# Patient Record
Sex: MALE | Race: WHITE | NOT HISPANIC OR LATINO | Employment: FULL TIME | URBAN - METROPOLITAN AREA
[De-identification: names, ages, dates, MRNs, and addresses within clinical notes are randomized per-mention and may not be internally consistent; named-entity substitution may affect disease eponyms.]

---

## 2017-08-29 ENCOUNTER — LAB REQUISITION (OUTPATIENT)
Dept: LAB | Facility: HOSPITAL | Age: 52
End: 2017-08-29

## 2017-08-29 DIAGNOSIS — D12.6 BENIGN NEOPLASM OF COLON: ICD-10-CM

## 2017-08-29 PROCEDURE — 88305 TISSUE EXAM BY PATHOLOGIST: CPT | Performed by: INTERNAL MEDICINE

## 2017-08-30 LAB
CYTO UR: NORMAL
LAB AP CASE REPORT: NORMAL
LAB AP CLINICAL INFORMATION: NORMAL
Lab: NORMAL
PATH REPORT.FINAL DX SPEC: NORMAL
PATH REPORT.GROSS SPEC: NORMAL

## 2019-09-14 ENCOUNTER — APPOINTMENT (OUTPATIENT)
Dept: CT IMAGING | Facility: HOSPITAL | Age: 54
End: 2019-09-14

## 2019-09-14 ENCOUNTER — APPOINTMENT (OUTPATIENT)
Dept: GENERAL RADIOLOGY | Facility: HOSPITAL | Age: 54
End: 2019-09-14

## 2019-09-14 ENCOUNTER — HOSPITAL ENCOUNTER (EMERGENCY)
Facility: HOSPITAL | Age: 54
Discharge: HOME | End: 2019-09-14
Attending: EMERGENCY MEDICINE | Admitting: EMERGENCY MEDICINE

## 2019-09-14 VITALS
DIASTOLIC BLOOD PRESSURE: 93 MMHG | SYSTOLIC BLOOD PRESSURE: 145 MMHG | TEMPERATURE: 97.9 F | BODY MASS INDEX: 30.77 KG/M2 | OXYGEN SATURATION: 96 % | WEIGHT: 203 LBS | HEART RATE: 52 BPM | RESPIRATION RATE: 18 BRPM | HEIGHT: 68 IN

## 2019-09-14 DIAGNOSIS — R07.9 ACUTE CHEST PAIN: Primary | ICD-10-CM

## 2019-09-14 LAB
ALBUMIN SERPL-MCNC: 4.6 G/DL (ref 3.5–5.2)
ALBUMIN/GLOB SERPL: 1.6 G/DL
ALP SERPL-CCNC: 83 U/L (ref 39–117)
ALT SERPL W P-5'-P-CCNC: 14 U/L (ref 1–41)
ANION GAP SERPL CALCULATED.3IONS-SCNC: 12 MMOL/L (ref 5–15)
AST SERPL-CCNC: 24 U/L (ref 1–40)
BASOPHILS # BLD AUTO: 0.06 10*3/MM3 (ref 0–0.2)
BASOPHILS NFR BLD AUTO: 0.8 % (ref 0–1.5)
BILIRUB SERPL-MCNC: 0.4 MG/DL (ref 0.2–1.2)
BUN BLD-MCNC: 14 MG/DL (ref 6–20)
BUN/CREAT SERPL: 14.1 (ref 7–25)
CALCIUM SPEC-SCNC: 9.1 MG/DL (ref 8.6–10.5)
CHLORIDE SERPL-SCNC: 99 MMOL/L (ref 98–107)
CO2 SERPL-SCNC: 28 MMOL/L (ref 22–29)
CREAT BLD-MCNC: 0.99 MG/DL (ref 0.76–1.27)
DEPRECATED RDW RBC AUTO: 41.9 FL (ref 37–54)
EOSINOPHIL # BLD AUTO: 0.3 10*3/MM3 (ref 0–0.4)
EOSINOPHIL NFR BLD AUTO: 4.1 % (ref 0.3–6.2)
ERYTHROCYTE [DISTWIDTH] IN BLOOD BY AUTOMATED COUNT: 13.3 % (ref 12.3–15.4)
GFR SERPL CREATININE-BSD FRML MDRD: 79 ML/MIN/1.73
GLOBULIN UR ELPH-MCNC: 2.8 GM/DL
GLUCOSE BLD-MCNC: 119 MG/DL (ref 65–99)
HCT VFR BLD AUTO: 48.3 % (ref 37.5–51)
HGB BLD-MCNC: 16.2 G/DL (ref 13–17.7)
HOLD SPECIMEN: NORMAL
HOLD SPECIMEN: NORMAL
IMM GRANULOCYTES # BLD AUTO: 0.02 10*3/MM3 (ref 0–0.05)
IMM GRANULOCYTES NFR BLD AUTO: 0.3 % (ref 0–0.5)
LIPASE SERPL-CCNC: 20 U/L (ref 13–60)
LYMPHOCYTES # BLD AUTO: 3.53 10*3/MM3 (ref 0.7–3.1)
LYMPHOCYTES NFR BLD AUTO: 47.9 % (ref 19.6–45.3)
MCH RBC QN AUTO: 28.7 PG (ref 26.6–33)
MCHC RBC AUTO-ENTMCNC: 33.5 G/DL (ref 31.5–35.7)
MCV RBC AUTO: 85.5 FL (ref 79–97)
MONOCYTES # BLD AUTO: 0.58 10*3/MM3 (ref 0.1–0.9)
MONOCYTES NFR BLD AUTO: 7.9 % (ref 5–12)
NEUTROPHILS # BLD AUTO: 2.88 10*3/MM3 (ref 1.7–7)
NEUTROPHILS NFR BLD AUTO: 39 % (ref 42.7–76)
NRBC BLD AUTO-RTO: 0 /100 WBC (ref 0–0.2)
NT-PROBNP SERPL-MCNC: 22.9 PG/ML (ref 5–900)
PLATELET # BLD AUTO: 228 10*3/MM3 (ref 140–450)
PMV BLD AUTO: 12.3 FL (ref 6–12)
POTASSIUM BLD-SCNC: 3.5 MMOL/L (ref 3.5–5.2)
PROT SERPL-MCNC: 7.4 G/DL (ref 6–8.5)
RBC # BLD AUTO: 5.65 10*6/MM3 (ref 4.14–5.8)
SODIUM BLD-SCNC: 139 MMOL/L (ref 136–145)
TROPONIN T SERPL-MCNC: <0.01 NG/ML (ref 0–0.03)
TROPONIN T SERPL-MCNC: <0.01 NG/ML (ref 0–0.03)
WBC NRBC COR # BLD: 7.37 10*3/MM3 (ref 3.4–10.8)
WHOLE BLOOD HOLD SPECIMEN: NORMAL
WHOLE BLOOD HOLD SPECIMEN: NORMAL

## 2019-09-14 PROCEDURE — 83690 ASSAY OF LIPASE: CPT | Performed by: EMERGENCY MEDICINE

## 2019-09-14 PROCEDURE — 93005 ELECTROCARDIOGRAM TRACING: CPT | Performed by: EMERGENCY MEDICINE

## 2019-09-14 PROCEDURE — 99284 EMERGENCY DEPT VISIT MOD MDM: CPT

## 2019-09-14 PROCEDURE — 71045 X-RAY EXAM CHEST 1 VIEW: CPT

## 2019-09-14 PROCEDURE — 83880 ASSAY OF NATRIURETIC PEPTIDE: CPT | Performed by: EMERGENCY MEDICINE

## 2019-09-14 PROCEDURE — 84484 ASSAY OF TROPONIN QUANT: CPT | Performed by: EMERGENCY MEDICINE

## 2019-09-14 PROCEDURE — 85025 COMPLETE CBC W/AUTO DIFF WBC: CPT | Performed by: EMERGENCY MEDICINE

## 2019-09-14 PROCEDURE — 71275 CT ANGIOGRAPHY CHEST: CPT

## 2019-09-14 PROCEDURE — 80053 COMPREHEN METABOLIC PANEL: CPT | Performed by: EMERGENCY MEDICINE

## 2019-09-14 PROCEDURE — 0 IOPAMIDOL PER 1 ML: Performed by: EMERGENCY MEDICINE

## 2019-09-14 RX ORDER — ASPIRIN 81 MG/1
324 TABLET, CHEWABLE ORAL ONCE
Status: COMPLETED | OUTPATIENT
Start: 2019-09-14 | End: 2019-09-14

## 2019-09-14 RX ORDER — SODIUM CHLORIDE 0.9 % (FLUSH) 0.9 %
10 SYRINGE (ML) INJECTION AS NEEDED
Status: DISCONTINUED | OUTPATIENT
Start: 2019-09-14 | End: 2019-09-14 | Stop reason: HOSPADM

## 2019-09-14 RX ORDER — NIFEDIPINE 60 MG/1
60 TABLET, EXTENDED RELEASE ORAL DAILY
COMMUNITY

## 2019-09-14 RX ADMIN — IOPAMIDOL 82 ML: 755 INJECTION, SOLUTION INTRAVENOUS at 15:28

## 2019-09-14 RX ADMIN — ASPIRIN 81 MG 243 MG: 81 TABLET ORAL at 13:02

## 2019-09-14 NOTE — ED PROVIDER NOTES
"Subjective   Mr. Kalyan Boss is a 53 year old male who presents to the ED with c/o chest pain.  Patient reports that he's experienced sporadic episodes of very transient (< 1 second) sharp chest pains for the past several years.  He's been evaluated for this in the past in another ER, but states at the time he was told it was just \"inflammation\" related.  Yesterday evening around 2000 patient began experiencing the familiar sharp pain in his left chest.  Howbeit, it lasted much longer than usual and in fact he went to bed with it.  Upon waking this morning he states it seemed to have dissipated; however, while in the action of sitting down on a chair later today he states the pain suddenly returned, much worse than before.  He presents to the ED with this acute issue.  On exam, his pain is still present.  It is further exacerbated with palpation.  He denies any associated nausea, vomiting, fevers, chills, cough or congestion.  Past medical history includes hypertension.  No personal or family history of heart disease.  He did have a stress test about 2 years ago (unrelated to his pain, mandated by his workplace) which was normal.  Former smoker (quit about 7 months ago).  No drug use.  Occasional alcohol use.        History provided by:  Patient and spouse  Chest Pain   Pain location:  L chest  Pain quality: sharp    Pain severity:  Severe  Onset quality:  Sudden  Duration: \"started last night at 2000, went away this morning but came back severe later today\"  Chronicity:  New  Exacerbated by: palpation.  Associated symptoms: no abdominal pain, no back pain, no cough, no diaphoresis, no fever, no nausea, no shortness of breath and no vomiting    Risk factors: hypertension, male sex and obesity    Risk factors: no coronary artery disease  Smoker: former.        Review of Systems   Constitutional: Negative for chills, diaphoresis and fever.   Respiratory: Negative for cough and shortness of breath.  "   Cardiovascular: Positive for chest pain.   Gastrointestinal: Negative for abdominal pain, nausea and vomiting.   Musculoskeletal: Negative for back pain.   Skin: Negative for pallor.   All other systems reviewed and are negative.      Past Medical History:   Diagnosis Date   • Hypertension        Allergies   Allergen Reactions   • No Known Drug Allergy        History reviewed. No pertinent surgical history.    History reviewed. No pertinent family history.    Social History     Socioeconomic History   • Marital status:      Spouse name: Not on file   • Number of children: Not on file   • Years of education: Not on file   • Highest education level: Not on file   Tobacco Use   • Smoking status: Former Smoker     Years: 30.00     Types: Cigarettes   • Tobacco comment: quit 7 months ago   Substance and Sexual Activity   • Alcohol use: No     Frequency: Never     Comment: 1 beer occassionally   • Drug use: No         Objective   Physical Exam   Constitutional: He is oriented to person, place, and time. He appears well-developed and well-nourished. No distress.   HENT:   Head: Normocephalic and atraumatic.   Eyes: Conjunctivae are normal. No scleral icterus.   Neck: Normal range of motion. Neck supple.   Cardiovascular: Normal rate, regular rhythm, normal heart sounds and intact distal pulses. Exam reveals no gallop and no friction rub.   No murmur heard.  Pulmonary/Chest: Effort normal and breath sounds normal. No respiratory distress. He has no wheezes. He has no rales. He exhibits tenderness.   Easily reproducible left-sided chest wall tenderness.   Abdominal: Soft. Bowel sounds are normal. There is no tenderness. There is no guarding.   Musculoskeletal: Normal range of motion.   Neurological: He is alert and oriented to person, place, and time.   Skin: Skin is warm and dry. He is not diaphoretic.   Psychiatric: He has a normal mood and affect. His behavior is normal.   Nursing note and vitals  reviewed.      Procedures         ED Course  ED Course as of Sep 14 1802   Sat Sep 14, 2019   1745 2- troponins 2- EKGs.  Pain is easily reproducible.  Also give him follow-up to the chest pain clinic well aware the signs symptoms worse condition.  He is also been little bit hoarse sidebar conversation and like referral to ENT.  I think this is reasonable.  Also give him primary care follow-up.  Patient works as a  for the [employer].  [JM]   1745 Pt thankful and agreeable with tx poc. Well aware of the ss of worsening condition.    [JM]      ED Course User Index  [JM] Ryan Dempsey APRN       Recent Results (from the past 24 hour(s))   Light Blue Top    Collection Time: 09/14/19  1:12 PM   Result Value Ref Range    Extra Tube hold for add-on    Lavender Top    Collection Time: 09/14/19  1:12 PM   Result Value Ref Range    Extra Tube hold for add-on    Gold Top - SST    Collection Time: 09/14/19  1:12 PM   Result Value Ref Range    Extra Tube Hold for add-ons.    CBC Auto Differential    Collection Time: 09/14/19  1:12 PM   Result Value Ref Range    WBC 7.37 3.40 - 10.80 10*3/mm3    RBC 5.65 4.14 - 5.80 10*6/mm3    Hemoglobin 16.2 13.0 - 17.7 g/dL    Hematocrit 48.3 37.5 - 51.0 %    MCV 85.5 79.0 - 97.0 fL    MCH 28.7 26.6 - 33.0 pg    MCHC 33.5 31.5 - 35.7 g/dL    RDW 13.3 12.3 - 15.4 %    RDW-SD 41.9 37.0 - 54.0 fl    MPV 12.3 (H) 6.0 - 12.0 fL    Platelets 228 140 - 450 10*3/mm3    Neutrophil % 39.0 (L) 42.7 - 76.0 %    Lymphocyte % 47.9 (H) 19.6 - 45.3 %    Monocyte % 7.9 5.0 - 12.0 %    Eosinophil % 4.1 0.3 - 6.2 %    Basophil % 0.8 0.0 - 1.5 %    Immature Grans % 0.3 0.0 - 0.5 %    Neutrophils, Absolute 2.88 1.70 - 7.00 10*3/mm3    Lymphocytes, Absolute 3.53 (H) 0.70 - 3.10 10*3/mm3    Monocytes, Absolute 0.58 0.10 - 0.90 10*3/mm3    Eosinophils, Absolute 0.30 0.00 - 0.40 10*3/mm3    Basophils, Absolute 0.06 0.00 - 0.20 10*3/mm3    Immature Grans, Absolute 0.02 0.00 - 0.05 10*3/mm3     nRBC 0.0 0.0 - 0.2 /100 WBC   Troponin    Collection Time: 09/14/19  1:44 PM   Result Value Ref Range    Troponin T <0.010 0.000 - 0.030 ng/mL   Comprehensive Metabolic Panel    Collection Time: 09/14/19  1:44 PM   Result Value Ref Range    Glucose 119 (H) 65 - 99 mg/dL    BUN 14 6 - 20 mg/dL    Creatinine 0.99 0.76 - 1.27 mg/dL    Sodium 139 136 - 145 mmol/L    Potassium 3.5 3.5 - 5.2 mmol/L    Chloride 99 98 - 107 mmol/L    CO2 28.0 22.0 - 29.0 mmol/L    Calcium 9.1 8.6 - 10.5 mg/dL    Total Protein 7.4 6.0 - 8.5 g/dL    Albumin 4.60 3.50 - 5.20 g/dL    ALT (SGPT) 14 1 - 41 U/L    AST (SGOT) 24 1 - 40 U/L    Alkaline Phosphatase 83 39 - 117 U/L    Total Bilirubin 0.4 0.2 - 1.2 mg/dL    eGFR Non African Amer 79 >60 mL/min/1.73    Globulin 2.8 gm/dL    A/G Ratio 1.6 g/dL    BUN/Creatinine Ratio 14.1 7.0 - 25.0    Anion Gap 12.0 5.0 - 15.0 mmol/L   Lipase    Collection Time: 09/14/19  1:44 PM   Result Value Ref Range    Lipase 20 13 - 60 U/L   BNP    Collection Time: 09/14/19  1:44 PM   Result Value Ref Range    proBNP 22.9 5.0 - 900.0 pg/mL   Green Top (Gel)    Collection Time: 09/14/19  1:44 PM   Result Value Ref Range    Extra Tube Hold for add-ons.    Troponin    Collection Time: 09/14/19  3:51 PM   Result Value Ref Range    Troponin T <0.010 0.000 - 0.030 ng/mL     Note: In addition to lab results from this visit, the labs listed above may include labs taken at another facility or during a different encounter within the last 24 hours. Please correlate lab times with ED admission and discharge times for further clarification of the services performed during this visit.    XR Chest 1 View   Preliminary Result   No acute cardiopulmonary process.       DICTATED:   09/14/2019   EDITED/ls :   09/14/2019           CT Angiogram Chest With Contrast   Preliminary Result   1. No PE.   2. No acute intrathoracic findings.                Vitals:    09/14/19 1615 09/14/19 1630 09/14/19 1644 09/14/19 1645   BP:  145/93     BP  Location:       Patient Position:       Pulse: 51 51 52    Resp:       Temp:       TempSrc:       SpO2: 96% 97%  96%   Weight:       Height:         Medications   sodium chloride 0.9 % flush 10 mL (not administered)   aspirin chewable tablet 324 mg (243 mg Oral Given 9/14/19 1302)   iopamidol (ISOVUE-370) 76 % injection 100 mL (82 mL Intravenous Given 9/14/19 1528)     ECG/EMG Results (last 24 hours)     Procedure Component Value Units Date/Time    ECG 12 Lead [[id number]] Collected:  09/14/19 1301     Updated:  09/14/19 1302        ECG 12 Lead         ECG 12 Lead               XR Chest 1 View   Preliminary Result   No acute cardiopulmonary process.       DICTATED:   09/14/2019   EDITED/ls :   09/14/2019           CT Angiogram Chest With Contrast   Preliminary Result   1. No PE.   2. No acute intrathoracic findings.                            [facility]    Final diagnoses:   Acute chest pain       Documentation assistance provided by lashonda Hardy.  Information recorded by the ilianaibmich was done at my direction and has been verified and validated by me.     Timmy Hardy  09/14/19 1446       Timmy Hardy  09/14/19 1450       Ryan Dempsey APRN  09/14/19 [id number]

## 2019-09-14 NOTE — DISCHARGE INSTRUCTIONS
Follow up with one of the [facility] Primary Care Providers below to setup primary care. If you need assistance coordinating a primary care appointment with a [facility] Primary Care Provider, please contact the Primary Care Coordinators at [phone] for appointment scheduling.    [facility], Primary Care   [address] , [address]   [place], [place] [address] [phone]    [facility] Internal Medicine & Endocrinology  [address], [address]  [place] [address] [phone]    [facility] Family Medicine  [address], [address]   [place] [address] [phone]    [facility] Primary Care  [address], [address]  [place], [place] [id number] [phone]    [facility], Primary Care,   [address], [address]   [place], [place] [address] [phone]    [facility]  [address]  [place], [place] [address] [phone]    [facility], Primary Care  [address], [address]  [place], [place] [address] [phone]    [facility] Internal Medicine & Pediatrics  [address], [address]   [place] [address] [phone]    [facility], Primary Care  [address] [place] [address] [phone]      [facility] Primary [facility]  [address], [address]   [place] [address] [phone]    [facility]  [address] [place] [address] [phone]

## 2019-09-18 ENCOUNTER — OFFICE VISIT (OUTPATIENT)
Dept: CARDIOLOGY | Facility: HOSPITAL | Age: 54
End: 2019-09-18

## 2019-09-18 VITALS
DIASTOLIC BLOOD PRESSURE: 93 MMHG | SYSTOLIC BLOOD PRESSURE: 146 MMHG | OXYGEN SATURATION: 100 % | HEIGHT: 68 IN | TEMPERATURE: 97 F | HEART RATE: 57 BPM | RESPIRATION RATE: 18 BRPM | WEIGHT: 212.38 LBS | BODY MASS INDEX: 32.19 KG/M2

## 2019-09-18 DIAGNOSIS — I10 ESSENTIAL HYPERTENSION: ICD-10-CM

## 2019-09-18 DIAGNOSIS — R07.89 CHEST PAIN, MUSCULOSKELETAL: Primary | ICD-10-CM

## 2019-09-18 DIAGNOSIS — R00.1 BRADYCARDIA: ICD-10-CM

## 2019-09-18 PROCEDURE — 99203 OFFICE O/P NEW LOW 30 MIN: CPT | Performed by: NURSE PRACTITIONER

## 2019-09-18 RX ORDER — ASPIRIN 81 MG/1
81 TABLET ORAL DAILY
COMMUNITY

## 2019-09-18 NOTE — PROGRESS NOTES
[facility]  Heart and Valve Center      Encounter Date:09/18/2019     Kalyan Boss  [address] [place] [address]  [unfilled]    1965    Lupe Olivas MD    Kalyan Boss is a 53 y.o. male.      Subjective:     Chief Complaint:  Chest Pain       HPI     53-year-old male presented to [facility] ED with complaints of chest pain.  Left-sided chest pain described as sharp.  Intermittent.  Short duration less than 1 second.  Have been occurring for greater than a year.  On day of ED presentation he had similar symptoms but longer duration.  Symptoms began the night before.  Went to bed.  Woke the next morning without symptoms.  Pain returned later in the day, much worse.  Exacerbated with movement, palpation..  Denies radiating symptoms, associated nausea, vomiting, dyspnea, diaphoresis, abdominal pain. Pt is a  and reports he is very active.  Denies exercise-induced chest pain, pressure, dizziness, syncope.  No palpitations.  No known injury to left side as known.  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  Patient reports having a stress test approximately 2 years ago.  No interventions.  Former smoker with cessation approximately 7 months ago.  No illicit drug use.  Occasional alcohol intake.  Has medical history includes hypertension.  No premature CAD in a first-degree relative.          Past Surgical History:   Procedure Laterality Date   • COLONOSCOPY  2017   • NO PAST SURGERIES         Allergies   Allergen Reactions   • No Known Drug Allergy          Current Outpatient Medications:   •  aspirin 81 MG EC tablet, Take 81 mg by mouth Daily., Disp: , Rfl:   •  hydrochlorothiazide (HYDRODIURIL) 25 MG tablet, Take  by mouth., Disp: , Rfl:   •  NIFEdipine XL (PROCARDIA XL) 60 MG 24 hr tablet, Take 60 mg by mouth Daily., Disp: , Rfl:     The following portions of the patient's history were  "reviewed and updated today during office visit as appropriate: allergies, current medications, past family history, past medical history, past social history, past surgical history and problem list.    Review of Systems   Cardiovascular: Positive for chest pain.   All other systems reviewed and are negative.      Objective:     Vitals:    09/18/19 0903 09/18/19 0905 09/18/19 0906   BP: 144/89 136/85 146/93   BP Location: Left arm Right arm Right arm   Patient Position: Sitting Sitting Standing   Cuff Size: Adult Adult Adult   Pulse: 52 52 57   Resp: 18     Temp: 97 °F (36.1 °C)     TempSrc: Temporal     SpO2: 98% 97% 100%   Weight: 96.3 kg (212 lb 6 oz)     Height: 172.7 cm (68\")           Physical Exam   Constitutional: He is oriented to person, place, and time. He appears well-developed and well-nourished. No distress.   HENT:   Mouth/Throat: Oropharynx is clear and moist.   Eyes: No scleral icterus.   Neck: No hepatojugular reflux and no JVD present. Carotid bruit is not present. No tracheal deviation present. No thyromegaly present.   Cardiovascular: Normal rate, regular rhythm, normal heart sounds and intact distal pulses. Exam reveals no friction rub.   No murmur heard.  Pulmonary/Chest: Effort normal and breath sounds normal.   Abdominal: Soft. Bowel sounds are normal. He exhibits no distension. There is no tenderness.   Musculoskeletal: He exhibits no edema.   Lymphadenopathy:     He has no cervical adenopathy.   Neurological: He is alert and oriented to person, place, and time.   Skin: Skin is warm, dry and intact. No rash noted. No cyanosis or erythema. No pallor.   Psychiatric: He has a normal mood and affect. His behavior is normal. Thought content normal.   Vitals reviewed.      Lab and Diagnostic Review:  Admission on 09/14/2019, Discharged on 09/14/2019   Component Date Value Ref Range Status   • Troponin T 09/14/2019 <0.010  0.000 - 0.030 ng/mL Final   • Glucose 09/14/2019 119* 65 - 99 mg/dL Final "   • BUN 09/14/2019 14  6 - 20 mg/dL Final   • Creatinine 09/14/2019 0.99  0.76 - 1.27 mg/dL Final   • Sodium 09/14/2019 139  136 - 145 mmol/L Final   • Potassium 09/14/2019 3.5  3.5 - 5.2 mmol/L Final   • Chloride 09/14/2019 99  98 - 107 mmol/L Final   • CO2 09/14/2019 28.0  22.0 - 29.0 mmol/L Final   • Calcium 09/14/2019 9.1  8.6 - 10.5 mg/dL Final   • Total Protein 09/14/2019 7.4  6.0 - 8.5 g/dL Final   • Albumin 09/14/2019 4.60  3.50 - 5.20 g/dL Final   • ALT (SGPT) 09/14/2019 14  1 - 41 U/L Final   • AST (SGOT) 09/14/2019 24  1 - 40 U/L Final    Specimen hemolyzed.  Results may be affected.   • Alkaline Phosphatase 09/14/2019 83  39 - 117 U/L Final   • Total Bilirubin 09/14/2019 0.4  0.2 - 1.2 mg/dL Final   • eGFR Non  Amer 09/14/2019 79  >60 mL/min/1.73 Final   • Globulin 09/14/2019 2.8  gm/dL Final   • A/G Ratio 09/14/2019 1.6  g/dL Final   • BUN/Creatinine Ratio 09/14/2019 14.1  7.0 - 25.0 Final   • Anion Gap 09/14/2019 12.0  5.0 - 15.0 mmol/L Final   • Lipase 09/14/2019 20  13 - 60 U/L Final   • proBNP 09/14/2019 22.9  5.0 - 900.0 pg/mL Final   • Extra Tube 09/14/2019 hold for add-on   Final    Auto resulted   • Extra Tube 09/14/2019 Hold for add-ons.   Final    Auto resulted.   • Extra Tube 09/14/2019 hold for add-on   Final    Auto resulted   • Extra Tube 09/14/2019 Hold for add-ons.   Final    Auto resulted.   • WBC 09/14/2019 7.37  3.40 - 10.80 10*3/mm3 Final   • RBC 09/14/2019 5.65  4.14 - 5.80 10*6/mm3 Final   • Hemoglobin 09/14/2019 16.2  13.0 - 17.7 g/dL Final   • Hematocrit 09/14/2019 48.3  37.5 - 51.0 % Final   • MCV 09/14/2019 85.5  79.0 - 97.0 fL Final   • MCH 09/14/2019 28.7  26.6 - 33.0 pg Final   • MCHC 09/14/2019 33.5  31.5 - 35.7 g/dL Final   • RDW 09/14/2019 13.3  12.3 - 15.4 % Final   • RDW-SD 09/14/2019 41.9  37.0 - 54.0 fl Final   • MPV 09/14/2019 12.3* 6.0 - 12.0 fL Final   • Platelets 09/14/2019 228  140 - 450 10*3/mm3 Final   • Neutrophil % 09/14/2019 39.0* 42.7 - 76.0 % Final    • Lymphocyte % 09/14/2019 47.9* 19.6 - 45.3 % Final   • Monocyte % 09/14/2019 7.9  5.0 - 12.0 % Final   • Eosinophil % 09/14/2019 4.1  0.3 - 6.2 % Final   • Basophil % 09/14/2019 0.8  0.0 - 1.5 % Final   • Immature Grans % 09/14/2019 0.3  0.0 - 0.5 % Final   • Neutrophils, Absolute 09/14/2019 2.88  1.70 - 7.00 10*3/mm3 Final   • Lymphocytes, Absolute 09/14/2019 3.53* 0.70 - 3.10 10*3/mm3 Final   • Monocytes, Absolute 09/14/2019 0.58  0.10 - 0.90 10*3/mm3 Final   • Eosinophils, Absolute 09/14/2019 0.30  0.00 - 0.40 10*3/mm3 Final   • Basophils, Absolute 09/14/2019 0.06  0.00 - 0.20 10*3/mm3 Final   • Immature Grans, Absolute 09/14/2019 0.02  0.00 - 0.05 10*3/mm3 Final   • nRBC 09/14/2019 0.0  0.0 - 0.2 /100 WBC Final   • Troponin T 09/14/2019 <0.010  0.000 - 0.030 ng/mL Final       Assessment and Plan:         1. Chest pain, musculoskeletal  Ibuprofen 400 mg 3 times a day for 5 days.  And as needed (if signs and symptoms do not improve or worsen please follow-up with primary care).  Encourage patient to not use ibuprofen on a daily basis for long-term without discussing with the provider    Reviewed cardiac risk factors, cardiac signs and symptoms of chest discomfort, differential diagnoses for chest pain.    Normal stress test 2 years ago with no exertional symptoms.  Continue to monitor at this time.    If signs and symptoms worsen or do not improve please follow-up at that time would consider stress testing    Follow-up with primary care provider on a routine basis.    2. Essential hypertension  Currently on HydroDIURIL, Procardia.  Blood pressure not at goal today.  Continue to monitor and follow-up with primary care provider    3.  Bradycardia, asymptomatic.    Face-to-face: 30 minutes spent with patient with 50% of time discussing diagnostics, plan of care and management, differentials, cardiac risk factors and cardiac signs and symptoms.    Please follow-up with primary care provider.  Follow-up in the heart  valve center as needed.        *Please note that portions of this note were completed with a voice recognition program. Efforts were made to edit the dictations, but occasionally words are mistranscribed.